# Patient Record
Sex: FEMALE | Race: WHITE | NOT HISPANIC OR LATINO | Employment: UNEMPLOYED | URBAN - METROPOLITAN AREA
[De-identification: names, ages, dates, MRNs, and addresses within clinical notes are randomized per-mention and may not be internally consistent; named-entity substitution may affect disease eponyms.]

---

## 2023-10-06 ENCOUNTER — APPOINTMENT (OUTPATIENT)
Dept: RADIOLOGY | Facility: HOSPITAL | Age: 68
DRG: 443 | End: 2023-10-06
Payer: MEDICARE

## 2023-10-06 ENCOUNTER — HOSPITAL ENCOUNTER (INPATIENT)
Facility: HOSPITAL | Age: 68
LOS: 1 days | Discharge: HOSPICE | DRG: 443 | End: 2023-10-06
Attending: EMERGENCY MEDICINE | Admitting: INTERNAL MEDICINE
Payer: MEDICARE

## 2023-10-06 VITALS
OXYGEN SATURATION: 95 % | HEIGHT: 62 IN | HEART RATE: 88 BPM | BODY MASS INDEX: 20.93 KG/M2 | RESPIRATION RATE: 15 BRPM | WEIGHT: 113.76 LBS | DIASTOLIC BLOOD PRESSURE: 59 MMHG | SYSTOLIC BLOOD PRESSURE: 117 MMHG

## 2023-10-06 DIAGNOSIS — R41.82 AMS (ALTERED MENTAL STATUS): ICD-10-CM

## 2023-10-06 DIAGNOSIS — K76.82 HEPATIC ENCEPHALOPATHY (MULTI): Primary | ICD-10-CM

## 2023-10-06 LAB
ALBUMIN SERPL BCP-MCNC: 2.7 G/DL (ref 3.4–5)
ALP SERPL-CCNC: 170 U/L (ref 33–136)
ALT SERPL W P-5'-P-CCNC: 36 U/L (ref 7–45)
AMMONIA PLAS-SCNC: 307 UMOL/L (ref 16–53)
ANION GAP BLDV CALCULATED.4IONS-SCNC: 11 MMOL/L (ref 10–25)
ANION GAP SERPL CALC-SCNC: 19 MMOL/L (ref 10–20)
AST SERPL W P-5'-P-CCNC: 68 U/L (ref 9–39)
BASE EXCESS BLDV CALC-SCNC: -3.1 MMOL/L (ref -2–3)
BILIRUB SERPL-MCNC: 18.3 MG/DL (ref 0–1.2)
BODY TEMPERATURE: 37 DEGREES CELSIUS
BUN SERPL-MCNC: 49 MG/DL (ref 6–23)
CA-I BLDV-SCNC: 1.41 MMOL/L (ref 1.1–1.33)
CALCIUM SERPL-MCNC: 9.9 MG/DL (ref 8.6–10.3)
CARDIAC TROPONIN I PNL SERPL HS: 26 NG/L (ref 0–13)
CHLORIDE BLDV-SCNC: 104 MMOL/L (ref 98–107)
CHLORIDE SERPL-SCNC: 101 MMOL/L (ref 98–107)
CO2 SERPL-SCNC: 20 MMOL/L (ref 21–32)
CREAT SERPL-MCNC: 1.48 MG/DL (ref 0.5–1.05)
ERYTHROCYTE [DISTWIDTH] IN BLOOD BY AUTOMATED COUNT: 17.6 % (ref 11.5–14.5)
GFR SERPL CREATININE-BSD FRML MDRD: 38 ML/MIN/1.73M*2
GLUCOSE BLD MANUAL STRIP-MCNC: 136 MG/DL (ref 74–99)
GLUCOSE BLDV-MCNC: 125 MG/DL (ref 74–99)
GLUCOSE SERPL-MCNC: 130 MG/DL (ref 74–99)
HCO3 BLDV-SCNC: 20.6 MMOL/L (ref 22–26)
HCT VFR BLD AUTO: 27.4 % (ref 36–46)
HCT VFR BLD EST: 27 % (ref 36–46)
HGB BLD-MCNC: 9.1 G/DL (ref 12–16)
HGB BLDV-MCNC: 9 G/DL (ref 12–16)
INR PPP: 2 (ref 0.9–1.1)
LACTATE BLDV-SCNC: 2 MMOL/L (ref 0.4–2)
MCH RBC QN AUTO: 37.3 PG (ref 26–34)
MCHC RBC AUTO-ENTMCNC: 33.2 G/DL (ref 32–36)
MCV RBC AUTO: 112 FL (ref 80–100)
NRBC BLD-RTO: 0.1 /100 WBCS (ref 0–0)
OXYHGB MFR BLDV: 82.2 % (ref 45–75)
PCO2 BLDV: 31 MM HG (ref 41–51)
PH BLDV: 7.43 PH (ref 7.33–7.43)
PLATELET # BLD AUTO: 94 X10*3/UL (ref 150–450)
PMV BLD AUTO: 10.9 FL (ref 7.5–11.5)
PO2 BLDV: 54 MM HG (ref 35–45)
POTASSIUM BLDV-SCNC: 5.8 MMOL/L (ref 3.5–5.3)
POTASSIUM SERPL-SCNC: 5.8 MMOL/L (ref 3.5–5.3)
PROT SERPL-MCNC: 5.4 G/DL (ref 6.4–8.2)
PROTHROMBIN TIME: 22.3 SECONDS (ref 9.8–12.8)
RBC # BLD AUTO: 2.44 X10*6/UL (ref 4–5.2)
SAO2 % BLDV: 88 % (ref 45–75)
SODIUM BLDV-SCNC: 130 MMOL/L (ref 136–145)
SODIUM SERPL-SCNC: 134 MMOL/L (ref 136–145)
WBC # BLD AUTO: 15.5 X10*3/UL (ref 4.4–11.3)

## 2023-10-06 PROCEDURE — 2580000001 HC RX 258 IV SOLUTIONS: Performed by: EMERGENCY MEDICINE

## 2023-10-06 PROCEDURE — 2500000001 HC RX 250 WO HCPCS SELF ADMINISTERED DRUGS (ALT 637 FOR MEDICARE OP): Performed by: INTERNAL MEDICINE

## 2023-10-06 PROCEDURE — 87040 BLOOD CULTURE FOR BACTERIA: CPT | Mod: AHULAB,CMCLAB | Performed by: INTERNAL MEDICINE

## 2023-10-06 PROCEDURE — 70498 CT ANGIOGRAPHY NECK: CPT | Performed by: RADIOLOGY

## 2023-10-06 PROCEDURE — 70496 CT ANGIOGRAPHY HEAD: CPT

## 2023-10-06 PROCEDURE — 36415 COLL VENOUS BLD VENIPUNCTURE: CPT | Performed by: EMERGENCY MEDICINE

## 2023-10-06 PROCEDURE — 70496 CT ANGIOGRAPHY HEAD: CPT | Performed by: RADIOLOGY

## 2023-10-06 PROCEDURE — 84484 ASSAY OF TROPONIN QUANT: CPT | Performed by: EMERGENCY MEDICINE

## 2023-10-06 PROCEDURE — 85027 COMPLETE CBC AUTOMATED: CPT | Performed by: EMERGENCY MEDICINE

## 2023-10-06 PROCEDURE — 71045 X-RAY EXAM CHEST 1 VIEW: CPT

## 2023-10-06 PROCEDURE — 84132 ASSAY OF SERUM POTASSIUM: CPT

## 2023-10-06 PROCEDURE — 99285 EMERGENCY DEPT VISIT HI MDM: CPT | Performed by: EMERGENCY MEDICINE

## 2023-10-06 PROCEDURE — 70450 CT HEAD/BRAIN W/O DYE: CPT | Performed by: RADIOLOGY

## 2023-10-06 PROCEDURE — 82947 ASSAY GLUCOSE BLOOD QUANT: CPT

## 2023-10-06 PROCEDURE — 82140 ASSAY OF AMMONIA: CPT | Performed by: EMERGENCY MEDICINE

## 2023-10-06 PROCEDURE — 70450 CT HEAD/BRAIN W/O DYE: CPT

## 2023-10-06 PROCEDURE — 85610 PROTHROMBIN TIME: CPT | Performed by: EMERGENCY MEDICINE

## 2023-10-06 PROCEDURE — 2550000001 HC RX 255 CONTRASTS: Performed by: EMERGENCY MEDICINE

## 2023-10-06 PROCEDURE — 2500000001 HC RX 250 WO HCPCS SELF ADMINISTERED DRUGS (ALT 637 FOR MEDICARE OP): Performed by: EMERGENCY MEDICINE

## 2023-10-06 PROCEDURE — 2500000004 HC RX 250 GENERAL PHARMACY W/ HCPCS (ALT 636 FOR OP/ED): Performed by: INTERNAL MEDICINE

## 2023-10-06 PROCEDURE — 70498 CT ANGIOGRAPHY NECK: CPT

## 2023-10-06 PROCEDURE — 36415 COLL VENOUS BLD VENIPUNCTURE: CPT | Performed by: INTERNAL MEDICINE

## 2023-10-06 PROCEDURE — 84075 ASSAY ALKALINE PHOSPHATASE: CPT | Performed by: EMERGENCY MEDICINE

## 2023-10-06 PROCEDURE — 1200000002 HC GENERAL ROOM WITH TELEMETRY DAILY

## 2023-10-06 RX ORDER — CALCIUM CARBONATE 300MG(750)
400 TABLET,CHEWABLE ORAL DAILY
COMMUNITY

## 2023-10-06 RX ORDER — LACTULOSE 10 G/15ML
20 SOLUTION ORAL 2 TIMES DAILY
Status: DISCONTINUED | OUTPATIENT
Start: 2023-10-06 | End: 2023-10-06

## 2023-10-06 RX ORDER — METOPROLOL TARTRATE 25 MG/1
25 TABLET, FILM COATED ORAL DAILY
COMMUNITY
End: 2023-10-06

## 2023-10-06 RX ORDER — TRAMADOL HYDROCHLORIDE 50 MG/1
50 TABLET ORAL EVERY 12 HOURS PRN
Status: DISCONTINUED | OUTPATIENT
Start: 2023-10-06 | End: 2023-10-07 | Stop reason: HOSPADM

## 2023-10-06 RX ORDER — LACTULOSE 10 G/15ML
30 SOLUTION ORAL DAILY
Status: DISCONTINUED | OUTPATIENT
Start: 2023-10-06 | End: 2023-10-06

## 2023-10-06 RX ORDER — PRAVASTATIN SODIUM 20 MG/1
20 TABLET ORAL DAILY
COMMUNITY

## 2023-10-06 RX ORDER — PANTOPRAZOLE SODIUM 40 MG/10ML
40 INJECTION, POWDER, LYOPHILIZED, FOR SOLUTION INTRAVENOUS DAILY
Status: DISCONTINUED | OUTPATIENT
Start: 2023-10-06 | End: 2023-10-07 | Stop reason: HOSPADM

## 2023-10-06 RX ORDER — ASPIRIN 325 MG
50000 TABLET, DELAYED RELEASE (ENTERIC COATED) ORAL 2 TIMES WEEKLY
COMMUNITY

## 2023-10-06 RX ORDER — TRAZODONE HYDROCHLORIDE 50 MG/1
25 TABLET ORAL NIGHTLY
COMMUNITY
End: 2023-10-06

## 2023-10-06 RX ORDER — FLUTICASONE PROPIONATE 50 MCG
2 SPRAY, SUSPENSION (ML) NASAL NIGHTLY
COMMUNITY

## 2023-10-06 RX ORDER — SODIUM CHLORIDE, SODIUM LACTATE, POTASSIUM CHLORIDE, CALCIUM CHLORIDE 600; 310; 30; 20 MG/100ML; MG/100ML; MG/100ML; MG/100ML
50 INJECTION, SOLUTION INTRAVENOUS CONTINUOUS
Status: DISCONTINUED | OUTPATIENT
Start: 2023-10-06 | End: 2023-10-06

## 2023-10-06 RX ORDER — LANOLIN ALCOHOL/MO/W.PET/CERES
400 CREAM (GRAM) TOPICAL DAILY
Status: CANCELLED | OUTPATIENT
Start: 2023-10-06

## 2023-10-06 RX ORDER — ONDANSETRON HYDROCHLORIDE 2 MG/ML
4 INJECTION, SOLUTION INTRAVENOUS EVERY 6 HOURS PRN
Status: DISCONTINUED | OUTPATIENT
Start: 2023-10-06 | End: 2023-10-07 | Stop reason: HOSPADM

## 2023-10-06 RX ORDER — LACTULOSE 10 G/15ML
20 SOLUTION ORAL 2 TIMES DAILY
COMMUNITY

## 2023-10-06 RX ORDER — LIDOCAINE HYDROCHLORIDE 20 MG/ML
1 JELLY TOPICAL ONCE
Status: COMPLETED | OUTPATIENT
Start: 2023-10-06 | End: 2023-10-06

## 2023-10-06 RX ORDER — BENZONATATE 100 MG/1
100 CAPSULE ORAL 3 TIMES DAILY
COMMUNITY

## 2023-10-06 RX ORDER — OMEPRAZOLE 40 MG/1
40 CAPSULE, DELAYED RELEASE ORAL
COMMUNITY

## 2023-10-06 RX ORDER — LACTULOSE 10 G/15ML
20 SOLUTION ORAL 3 TIMES DAILY
Status: DISCONTINUED | OUTPATIENT
Start: 2023-10-07 | End: 2023-10-07 | Stop reason: HOSPADM

## 2023-10-06 RX ORDER — MONTELUKAST SODIUM 10 MG/1
10 TABLET ORAL NIGHTLY
COMMUNITY
End: 2023-10-06

## 2023-10-06 RX ORDER — TRAMADOL HYDROCHLORIDE 50 MG/1
50 TABLET ORAL EVERY 12 HOURS PRN
COMMUNITY

## 2023-10-06 RX ORDER — LANOLIN ALCOHOL/MO/W.PET/CERES
100 CREAM (GRAM) TOPICAL DAILY
COMMUNITY

## 2023-10-06 RX ORDER — FLUTICASONE FUROATE, UMECLIDINIUM BROMIDE AND VILANTEROL TRIFENATATE 100; 62.5; 25 UG/1; UG/1; UG/1
1 POWDER RESPIRATORY (INHALATION) DAILY
COMMUNITY

## 2023-10-06 RX ORDER — LIDOCAINE HYDROCHLORIDE 20 MG/ML
1 JELLY TOPICAL ONCE
Status: DISCONTINUED | OUTPATIENT
Start: 2023-10-06 | End: 2023-10-06

## 2023-10-06 RX ORDER — FLUTICASONE FUROATE AND VILANTEROL 100; 25 UG/1; UG/1
1 POWDER RESPIRATORY (INHALATION)
Status: CANCELLED | OUTPATIENT
Start: 2023-10-06

## 2023-10-06 RX ADMIN — SODIUM CHLORIDE, POTASSIUM CHLORIDE, SODIUM LACTATE AND CALCIUM CHLORIDE 500 ML: 600; 310; 30; 20 INJECTION, SOLUTION INTRAVENOUS at 09:49

## 2023-10-06 RX ADMIN — LIDOCAINE HYDROCHLORIDE 1 APPLICATION: 20 JELLY TOPICAL at 10:25

## 2023-10-06 RX ADMIN — IOHEXOL 66 ML: 350 INJECTION, SOLUTION INTRAVENOUS at 09:11

## 2023-10-06 RX ADMIN — SODIUM CHLORIDE, POTASSIUM CHLORIDE, SODIUM LACTATE AND CALCIUM CHLORIDE 50 ML/HR: 600; 310; 30; 20 INJECTION, SOLUTION INTRAVENOUS at 14:10

## 2023-10-06 RX ADMIN — LACTULOSE 30 G: 20 SOLUTION ORAL at 12:38

## 2023-10-06 RX ADMIN — PIPERACILLIN SODIUM AND TAZOBACTAM SODIUM 2.25 G: 2; .25 INJECTION, SOLUTION INTRAVENOUS at 16:02

## 2023-10-06 RX ADMIN — LACTULOSE 20 G: 20 SOLUTION ORAL at 18:25

## 2023-10-06 SDOH — ECONOMIC STABILITY: FOOD INSECURITY: WITHIN THE PAST 12 MONTHS, YOU WORRIED THAT YOUR FOOD WOULD RUN OUT BEFORE YOU GOT MONEY TO BUY MORE.: NEVER TRUE

## 2023-10-06 SDOH — SOCIAL STABILITY: SOCIAL INSECURITY
WITHIN THE LAST YEAR, HAVE YOU BEEN KICKED, HIT, SLAPPED, OR OTHERWISE PHYSICALLY HURT BY YOUR PARTNER OR EX-PARTNER?: NO

## 2023-10-06 SDOH — HEALTH STABILITY: MENTAL HEALTH: HOW OFTEN DO YOU HAVE 6 OR MORE DRINKS ON ONE OCCASION?: NEVER

## 2023-10-06 SDOH — HEALTH STABILITY: MENTAL HEALTH: HOW OFTEN DO YOU HAVE A DRINK CONTAINING ALCOHOL?: NEVER

## 2023-10-06 SDOH — ECONOMIC STABILITY: FOOD INSECURITY: WITHIN THE PAST 12 MONTHS, THE FOOD YOU BOUGHT JUST DIDN'T LAST AND YOU DIDN'T HAVE MONEY TO GET MORE.: NEVER TRUE

## 2023-10-06 SDOH — ECONOMIC STABILITY: HOUSING INSECURITY: IN THE LAST 12 MONTHS, HOW MANY PLACES HAVE YOU LIVED?: 2

## 2023-10-06 SDOH — HEALTH STABILITY: PHYSICAL HEALTH: ON AVERAGE, HOW MANY DAYS PER WEEK DO YOU ENGAGE IN MODERATE TO STRENUOUS EXERCISE (LIKE A BRISK WALK)?: 0 DAYS

## 2023-10-06 SDOH — SOCIAL STABILITY: SOCIAL INSECURITY: WITHIN THE LAST YEAR, HAVE YOU BEEN AFRAID OF YOUR PARTNER OR EX-PARTNER?: NO

## 2023-10-06 SDOH — ECONOMIC STABILITY: INCOME INSECURITY: HOW HARD IS IT FOR YOU TO PAY FOR THE VERY BASICS LIKE FOOD, HOUSING, MEDICAL CARE, AND HEATING?: NOT HARD AT ALL

## 2023-10-06 SDOH — ECONOMIC STABILITY: INCOME INSECURITY: IN THE LAST 12 MONTHS, WAS THERE A TIME WHEN YOU WERE NOT ABLE TO PAY THE MORTGAGE OR RENT ON TIME?: NO

## 2023-10-06 SDOH — HEALTH STABILITY: MENTAL HEALTH
STRESS IS WHEN SOMEONE FEELS TENSE, NERVOUS, ANXIOUS, OR CAN'T SLEEP AT NIGHT BECAUSE THEIR MIND IS TROUBLED. HOW STRESSED ARE YOU?: TO SOME EXTENT

## 2023-10-06 SDOH — SOCIAL STABILITY: SOCIAL NETWORK: ARE YOU MARRIED, WIDOWED, DIVORCED, SEPARATED, NEVER MARRIED, OR LIVING WITH A PARTNER?: MARRIED

## 2023-10-06 SDOH — SOCIAL STABILITY: SOCIAL NETWORK: IN A TYPICAL WEEK, HOW MANY TIMES DO YOU TALK ON THE PHONE WITH FAMILY, FRIENDS, OR NEIGHBORS?: THREE TIMES A WEEK

## 2023-10-06 SDOH — SOCIAL STABILITY: SOCIAL NETWORK
DO YOU BELONG TO ANY CLUBS OR ORGANIZATIONS SUCH AS CHURCH GROUPS UNIONS, FRATERNAL OR ATHLETIC GROUPS, OR SCHOOL GROUPS?: NO

## 2023-10-06 SDOH — SOCIAL STABILITY: SOCIAL INSECURITY: WITHIN THE LAST YEAR, HAVE YOU BEEN HUMILIATED OR EMOTIONALLY ABUSED IN OTHER WAYS BY YOUR PARTNER OR EX-PARTNER?: NO

## 2023-10-06 SDOH — SOCIAL STABILITY: SOCIAL NETWORK: HOW OFTEN DO YOU GET TOGETHER WITH FRIENDS OR RELATIVES?: THREE TIMES A WEEK

## 2023-10-06 SDOH — ECONOMIC STABILITY: TRANSPORTATION INSECURITY
IN THE PAST 12 MONTHS, HAS THE LACK OF TRANSPORTATION KEPT YOU FROM MEDICAL APPOINTMENTS OR FROM GETTING MEDICATIONS?: NO

## 2023-10-06 SDOH — SOCIAL STABILITY: SOCIAL NETWORK: HOW OFTEN DO YOU ATTENT MEETINGS OF THE CLUB OR ORGANIZATION YOU BELONG TO?: NEVER

## 2023-10-06 SDOH — HEALTH STABILITY: MENTAL HEALTH: HOW MANY STANDARD DRINKS CONTAINING ALCOHOL DO YOU HAVE ON A TYPICAL DAY?: PATIENT DOES NOT DRINK

## 2023-10-06 SDOH — ECONOMIC STABILITY: TRANSPORTATION INSECURITY
IN THE PAST 12 MONTHS, HAS LACK OF TRANSPORTATION KEPT YOU FROM MEETINGS, WORK, OR FROM GETTING THINGS NEEDED FOR DAILY LIVING?: NO

## 2023-10-06 SDOH — ECONOMIC STABILITY: INCOME INSECURITY: IN THE PAST 12 MONTHS, HAS THE ELECTRIC, GAS, OIL, OR WATER COMPANY THREATENED TO SHUT OFF SERVICE IN YOUR HOME?: NO

## 2023-10-06 SDOH — ECONOMIC STABILITY: HOUSING INSECURITY
IN THE LAST 12 MONTHS, WAS THERE A TIME WHEN YOU DID NOT HAVE A STEADY PLACE TO SLEEP OR SLEPT IN A SHELTER (INCLUDING NOW)?: NO

## 2023-10-06 SDOH — HEALTH STABILITY: PHYSICAL HEALTH: ON AVERAGE, HOW MANY MINUTES DO YOU ENGAGE IN EXERCISE AT THIS LEVEL?: 0 MIN

## 2023-10-06 SDOH — SOCIAL STABILITY: SOCIAL NETWORK: HOW OFTEN DO YOU ATTEND CHURCH OR RELIGIOUS SERVICES?: NEVER

## 2023-10-06 ASSESSMENT — PAIN DESCRIPTION - PROGRESSION: CLINICAL_PROGRESSION: NOT CHANGED

## 2023-10-06 ASSESSMENT — COLUMBIA-SUICIDE SEVERITY RATING SCALE - C-SSRS
2. HAVE YOU ACTUALLY HAD ANY THOUGHTS OF KILLING YOURSELF?: NO
6. HAVE YOU EVER DONE ANYTHING, STARTED TO DO ANYTHING, OR PREPARED TO DO ANYTHING TO END YOUR LIFE?: NO
1. IN THE PAST MONTH, HAVE YOU WISHED YOU WERE DEAD OR WISHED YOU COULD GO TO SLEEP AND NOT WAKE UP?: NO

## 2023-10-06 ASSESSMENT — PAIN SCALES - GENERAL
PAINLEVEL_OUTOF10: 0 - NO PAIN

## 2023-10-06 ASSESSMENT — LIFESTYLE VARIABLES
SKIP TO QUESTIONS 9-10: 1
AUDIT-C TOTAL SCORE: 0

## 2023-10-06 ASSESSMENT — PAIN - FUNCTIONAL ASSESSMENT: PAIN_FUNCTIONAL_ASSESSMENT: 0-10

## 2023-10-06 NOTE — PROGRESS NOTES
10/06/23 1408   Physical Activity   On average, how many days per week do you engage in moderate to strenuous exercise (like a brisk walk)? 0 days   On average, how many minutes do you engage in exercise at this level? 0 min   Financial Resource Strain   How hard is it for you to pay for the very basics like food, housing, medical care, and heating? Not hard   Housing Stability   In the last 12 months, was there a time when you were not able to pay the mortgage or rent on time? N   In the last 12 months, how many places have you lived? 2   In the last 12 months, was there a time when you did not have a steady place to sleep or slept in a shelter (including now)? N   Transportation Needs   In the past 12 months, has lack of transportation kept you from medical appointments or from getting medications? no   In the past 12 months, has lack of transportation kept you from meetings, work, or from getting things needed for daily living? No   Food Insecurity   Within the past 12 months, you worried that your food would run out before you got the money to buy more. Never true   Within the past 12 months, the food you bought just didn't last and you didn't have money to get more. Never true   Stress   Do you feel stress - tense, restless, nervous, or anxious, or unable to sleep at night because your mind is troubled all the time - these days? To some exte   Social Connections   In a typical week, how many times do you talk on the phone with family, friends, or neighbors? Three   How often do you get together with friends or relatives? Three times   How often do you attend Advent or Jewish services? Never   Do you belong to any clubs or organizations such as Advent groups, unions, fraternal or athletic groups, or school groups? No   How often do you attend meetings of the clubs or organizations you belong to? Never   Are you , , , , never , or living with a partner?     Intimate Partner Violence   Within the last year, have you been afraid of your partner or ex-partner? No   Within the last year, have you been humiliated or emotionally abused in other ways by your partner or ex-partner? No   Within the last year, have you been kicked, hit, slapped, or otherwise physically hurt by your partner or ex-partner? No   Alcohol Use   Q1: How often do you have a drink containing alcohol? Never  (history of alcohol abuse)   Q2: How many drinks containing alcohol do you have on a typical day when you are drinking? None   Q3: How often do you have six or more drinks on one occasion? Never   Utilities   In the past 12 months has the electric, gas, oil, or water company threatened to shut off services in your home? No

## 2023-10-06 NOTE — ED PROVIDER NOTES
HPI   Chief Complaint   Patient presents with    Stroke       67-year-old woman past medical history of alcoholism and liver failure here with altered mental status over the past 24 hours.  Patient is at [place] [facility].  Patient's  reports that her baseline she is awake and alert and she actually just had a liver transplant virtual visit yesterday where she was talkative.  He is aware that her liver enzymes have been elevated and thinks that it was related to an ammonia level being elevated.  Patient had a normal blood sugar prior to arrival and stroke alert was called immediately.      History provided by:  Caregiver  History limited by:  Acuity of condition                      [place] Coma Scale Score: 9         NIH Stroke Scale: 18          Patient History   Past Medical History:   Diagnosis Date    Other specified anxiety disorders     Depression with anxiety    Personal history of other diseases of the circulatory system     Personal history of cardiac murmur    Personal history of other diseases of the circulatory system     History of hypertension     Past Surgical History:   Procedure Laterality Date     SECTION, CLASSIC  2013     Section    OTHER SURGICAL HISTORY  2013    Cornea Transplant     No family history on file.  Social History     Tobacco Use    Smoking status: Not on file    Smokeless tobacco: Not on file   Substance Use Topics    Alcohol use: Not on file    Drug use: Not on file       Physical Exam   ED Triage Vitals   Temp Heart Rate Resp BP   -- 10/06/23 0905 10/06/23 0905 10/06/23 0905    98 18 128/68      SpO2 Temp src Heart Rate Source Patient Position   10/06/23 0905 -- 10/06/23 0855 --   94 %  Monitor       BP Location FiO2 (%)     -- --             Physical Exam  Vitals and nursing note reviewed.   Constitutional:       General: She is in acute distress.      Appearance: Normal appearance. She is ill-appearing. She is not toxic-appearing.   HENT:       Head: Normocephalic.      Mouth/Throat:      Mouth: Mucous membranes are moist.   Eyes:      General: Scleral icterus present.      Conjunctiva/sclera: Conjunctivae normal.      Pupils: Pupils are equal, round, and reactive to light.   Cardiovascular:      Rate and Rhythm: Normal rate and regular rhythm.      Pulses:           Radial pulses are 2+ on the right side and 2+ on the left side.   Pulmonary:      Effort: Pulmonary effort is normal. No respiratory distress.      Breath sounds: Normal breath sounds.   Abdominal:      General: Abdomen is flat.      Palpations: Abdomen is soft.      Tenderness: There is no abdominal tenderness. There is no guarding or rebound.   Musculoskeletal:      Cervical back: Normal range of motion and neck supple.      Right lower leg: No edema.      Left lower leg: No edema.   Skin:     General: Skin is warm.      Coloration: Skin is jaundiced.      Findings: Bruising present.   Neurological:      Mental Status: She is alert. She is disoriented.      Comments: Patient's eyes are roaming but mostly gaze preference to the right.  Patient has relaxed tone throughout her extremities.  She is not responding to painful stimulation.  She is protecting her airway.  She is nonverbal.  Her eyes are intermittently open spontaneously.       Results for orders placed or performed during the hospital encounter of 10/06/23   CBC   Result Value Ref Range    WBC 15.5 (H) 4.4 - 11.3 x10*3/uL    nRBC 0.1 (H) 0.0 - 0.0 /100 WBCs    RBC 2.44 (L) 4.00 - 5.20 x10*6/uL    Hemoglobin 9.1 (L) 12.0 - 16.0 g/dL    Hematocrit 27.4 (L) 36.0 - 46.0 %     (H) 80 - 100 fL    MCH 37.3 (H) 26.0 - 34.0 pg    MCHC 33.2 32.0 - 36.0 g/dL    RDW 17.6 (H) 11.5 - 14.5 %    Platelets 94 (L) 150 - 450 x10*3/uL    MPV 10.9 7.5 - 11.5 fL   Comprehensive Metabolic Panel   Result Value Ref Range    Glucose 130 (H) 74 - 99 mg/dL    Sodium 134 (L) 136 - 145 mmol/L    Potassium 5.8 (H) 3.5 - 5.3 mmol/L    Chloride 101 98 - 107  mmol/L    Bicarbonate 20 (L) 21 - 32 mmol/L    Anion Gap 19 10 - 20 mmol/L    Urea Nitrogen 49 (H) 6 - 23 mg/dL    Creatinine 1.48 (H) 0.50 - 1.05 mg/dL    eGFR 38 (L) >60 mL/min/1.73m*2    Calcium 9.9 8.6 - 10.3 mg/dL    Albumin 2.7 (L) 3.4 - 5.0 g/dL    Alkaline Phosphatase 170 (H) 33 - 136 U/L    Total Protein 5.4 (L) 6.4 - 8.2 g/dL    AST 68 (H) 9 - 39 U/L    Bilirubin, Total 18.3 (H) 0.0 - 1.2 mg/dL    ALT 36 7 - 45 U/L   Protime-INR   Result Value Ref Range    Protime 22.3 (H) 9.8 - 12.8 seconds    INR 2.0 (H) 0.9 - 1.1   Troponin I, High Sensitivity   Result Value Ref Range    Troponin I, High Sensitivity 26 (H) 0 - 13 ng/L   Ammonia   Result Value Ref Range    Ammonia 307 (HH) 16 - 53 umol/L   POCT GLUCOSE   Result Value Ref Range    POCT Glucose 136 (H) 74 - 99 mg/dL   Blood Gas Venous Full Panel Unsolicited   Result Value Ref Range    POCT pH, Venous 7.43 7.33 - 7.43 pH    POCT pCO2, Venous 31 (L) 41 - 51 mm Hg    POCT pO2, Venous 54 (H) 35 - 45 mm Hg    POCT SO2, Venous 88 (H) 45 - 75 %    POCT Oxy Hemoglobin, Venous 82.2 (H) 45.0 - 75.0 %    POCT Hematocrit Calculated, Venous 27.0 (L) 36.0 - 46.0 %    POCT Sodium, Venous 130 (L) 136 - 145 mmol/L    POCT Potassium, Venous 5.8 (H) 3.5 - 5.3 mmol/L    POCT Chloride, Venous 104 98 - 107 mmol/L    POCT Ionized Calicum, Venous 1.41 (H) 1.10 - 1.33 mmol/L    POCT Glucose, Venous 125 (H) 74 - 99 mg/dL    POCT Lactate, Venous 2.0 0.4 - 2.0 mmol/L    POCT Base Excess, Venous -3.1 (L) -2.0 - 3.0 mmol/L    POCT HCO3 Calculated, Venous 20.6 (L) 22.0 - 26.0 mmol/L    POCT Hemoglobin, Venous 9.0 (L) 12.0 - 16.0 g/dL    POCT Anion Gap, Venous 11.0 10.0 - 25.0 mmol/L    Patient Temperature 37.0 degrees Celsius        ED Course & [facility]   ED Course as of 10/06/23 1411   Fri Oct 06, 2023   1327 CT brain attack head wo IV contrast [JG]   1327 Patient was a stroke alert and there is no acute process noted on her CT head or CT angio head and neck.  Patient's ammonia level is  elevated above 300.  I ordered a nasogastric tube to get lactulose into her.  I discussed the case with Dr. Adkins and due to the fact that she is a [facility] patient followed by Dr. Scott she is going to be transferred to [facility].  Dr. Jalyn Nguyen is willing to write transitional orders for her if it takes more than a day to get her transferred over to [facility].  She is hemodynamically stable in the emergency department.  [JG]   1334 I did not give tPA to her because this was not an actual stroke and I have another alternate diagnosis to explain her altered mental status. [JG]      ED Course User Index  [JG] Andra Pabon MD         Diagnoses as of 10/06/23 1411   Hepatic encephalopathy (CMS/HCC)       Medical Decision Making      Procedure  Procedures     Andra Pabon MD  10/06/23 1332       Andra Pabon MD  10/06/23 1334       Andra Pabon MD  10/06/23 1412

## 2023-10-06 NOTE — PROGRESS NOTES
Pharmacy Medication History Review    Melissa Gudino is a 67 y.o. female.    Medication list updated per SNF list.     The list below reflectives the updated PTA list. Please review each medication in order reconciliation for additional clarification and justification.    Prior to Admission Medications   Prescriptions Last Dose Informant Patient Reported? Taking?   benzonatate (Tessalon) 100 mg capsule   Yes No   Sig: Take 1 capsule (100 mg) by mouth 3 times a day. Do not crush or chew.   cholecalciferol (Vitamin D-3) 1,250 mcg (50,000 unit) capsule   Yes No   Sig: Take 1 capsule (50,000 Units) by mouth 2 times a week. Monday and Thursday   fluticasone (Flonase) 50 mcg/actuation nasal spray   Yes No   Sig: Administer 2 sprays into each nostril once daily at bedtime. Shake gently. Before first use, prime pump. After use, clean tip and replace cap.   fluticasone-umeclidin-vilanter (Trelegy Ellipta) 100-62.5-25 mcg blister with device   Yes No   Sig: Inhale 1 puff once daily.   lactulose 20 gram/30 mL oral solution   Yes No   Sig: Take 30 mL (20 g) by mouth 2 times a day.   magnesium oxide (Mag-Ox) 400 mg tablet   Yes No   Sig: Take 1 tablet (400 mg) by mouth once daily.   metoprolol tartrate (Lopressor) 25 mg tablet   Yes No   Sig: Take 1 tablet (25 mg) by mouth once daily.   montelukast (Singulair) 10 mg tablet   Yes No   Sig: Take 1 tablet (10 mg) by mouth once daily at bedtime.   multivitamin with minerals (multivitamin-iron-folic acid) tablet   Yes No   Sig: Take 1 tablet by mouth once daily.   omeprazole (PriLOSEC) 40 mg DR capsule   Yes No   Sig: Take 1 capsule (40 mg) by mouth once daily in the morning. Take before meals. Do not crush or chew.   pravastatin (Pravachol) 20 mg tablet   Yes No   Sig: Take 1 tablet (20 mg) by mouth once daily.   thiamine 100 mg tablet   Yes No   Sig: Take 1 tablet (100 mg) by mouth once daily.   traMADol (Ultram) 50 mg tablet   Yes No   Sig: Take 1 tablet (50 mg) by mouth every 12  hours if needed (pain).   traZODone (Desyrel) 50 mg tablet   Yes No   Sig: Take 0.5 tablets (25 mg) by mouth once daily at bedtime.      Facility-Administered Medications: None           The list below reflectives the updated allergy list. Please review each documented allergy for additional clarification and justification.  Allergies  Indicated as Unable to Assess by Heaven Eubanks, EMT on 10/6/2023 (Patient unreliable)   Not on File         Rama Broussard, CecileD

## 2023-10-06 NOTE — PROGRESS NOTES
10/06/23 1404   [facility] Disability Status   Are you deaf or do you have serious difficulty hearing? N   Are you blind or do you have serious difficulty seeing, even when wearing glasses? N   Because of a physical, mental, or emotional condition, do you have serious difficulty concentrating, remembering, or making decisions? (5 years old or older) Y   Do you have serious difficulty walking or climbing stairs? Y   Do you have serious difficulty dressing or bathing? Y   Because of a physical, mental, or emotional condition, do you have serious difficulty doing errands alone such as visiting the doctor? Y

## 2023-10-06 NOTE — PROGRESS NOTES
From [facility]     10/06/23 1249   Current Planned Discharge Disposition   Current Planned Discharge Disposition SNF

## 2023-10-06 NOTE — PROGRESS NOTES
Transitional Care Coordination Progress Note:  Plan per Medical/Surgical team: treatment of hepatic encephalopathy with lactulose, NGT  Status:inpatient  Payor source: medicare  Discharge disposition: [facility]   Potential Barriers: ammonia 113, bilirubin 16  ADOD: 10/8/2023  ABDULLAHI Tran RN, BSN Transitional Care Coordinator ED# [phone]      10/06/23 1404   Discharge Planning   Living Arrangements Alone   Support Systems Spouse/significant other   Assistance Needed NGT, ?nutrition, ammonia level   Type of Residence Skilled nursing facility   Do you have animals or pets at home? No   Home or Post Acute Services Post acute facilities (Rehab/SNF/etc)   Type of Post Acute Facility Services Skilled nursing   Patient expects to be discharged to: [facility]   Does the patient need discharge transport arranged? Yes   RoundTrip coordination needed? Yes   Has discharge transport been arranged? No

## 2023-10-06 NOTE — PROGRESS NOTES
"Melissa Gudino is a 67 y.o. female on day 0 of admission presenting with Hepatic encephalopathy (CMS/HCC).    Subjective   Patient endorsed to me by prior care team.  Originally excepted to [facility] but unlikely bed availability.  Therefore initially excepted Dr. DAMION Nguyen, but bed did become available and patient will be transferred to [facility] as per previous plan under Dr. Adkins.  Transfer note completed.  Patient remained stable otherwise for transfer.       Objective     Physical Exam    Last Recorded Vitals  Blood pressure 106/56, pulse 87, resp. rate 12, height 1.575 m (5' 2\"), weight 51.6 kg (113 lb 12.1 oz), SpO2 98 %.  Intake/Output last 3 Shifts:  I/O last 3 completed shifts:  In: 550 (10.7 mL/kg) [IV Piggyback:550]  Out: - (0 mL/kg)   Weight: 51.6 kg     Relevant Results           This patient currently has cardiac telemetry ordered; if you would like to modify or discontinue the telemetry order, click here to go to the orders activity to modify/discontinue the order.                 Assessment/Plan   Principal Problem:    Hepatic encephalopathy (CMS/HCC)    Transfer to [facility]             Edgar Richardson MD MPH      "

## 2023-10-07 NOTE — H&P
"History Of Present Illness  Melissa Gudino is a 67 y.o. female presenting with altered mental status, she has end-stage liver disease, she had severe jaundice, she has recurrent hospitalization, history of alcohol abuse in the past, today she was noted to be had lethargic nests.  She was transferred to [facility] emergency department found to be having ammonia level of 307 and abnormal liver transaminases high INR she is being admitted here.  She is usually a [facility] patient and she is waiting to be transferred to the [facility].  .     Past Medical History  She has a past medical history of Other specified anxiety disorders, Personal history of other diseases of the circulatory system, and Personal history of other diseases of the circulatory system.    Surgical History  She has a past surgical history that includes  section, classic (2013) and Other surgical history (2013).     Social History  She has no history on file for tobacco use, alcohol use, and drug use.    Family History  No family history on file.     Allergies  Patient has no known allergies.  Review of systems unable she is very lethargic       Physical Exam  Constitutional:       General: She is not in acute distress.  HENT:      Head: Normocephalic and atraumatic.   Cardiovascular:      Rate and Rhythm: Normal rate and regular rhythm.   Pulmonary:      Breath sounds: Normal breath sounds.   Musculoskeletal:         General: Swelling present.   Skin:     Coloration: Skin is jaundiced.   Neurological:      Motor: Weakness present.          Last Recorded Vitals  Blood pressure 106/56, pulse 87, resp. rate 12, height 1.575 m (5' 2\"), weight 51.6 kg (113 lb 12.1 oz), SpO2 98 %.    Relevant Results  Results for orders placed or performed during the hospital encounter of 10/06/23 (from the past 96 hour(s))   POCT GLUCOSE   Result Value Ref Range    POCT Glucose 136 (H) 74 - 99 mg/dL   CBC   Result Value Ref Range    WBC 15.5 " (H) 4.4 - 11.3 x10*3/uL    nRBC 0.1 (H) 0.0 - 0.0 /100 WBCs    RBC 2.44 (L) 4.00 - 5.20 x10*6/uL    Hemoglobin 9.1 (L) 12.0 - 16.0 g/dL    Hematocrit 27.4 (L) 36.0 - 46.0 %     (H) 80 - 100 fL    MCH 37.3 (H) 26.0 - 34.0 pg    MCHC 33.2 32.0 - 36.0 g/dL    RDW 17.6 (H) 11.5 - 14.5 %    Platelets 94 (L) 150 - 450 x10*3/uL    MPV 10.9 7.5 - 11.5 fL   Comprehensive Metabolic Panel   Result Value Ref Range    Glucose 130 (H) 74 - 99 mg/dL    Sodium 134 (L) 136 - 145 mmol/L    Potassium 5.8 (H) 3.5 - 5.3 mmol/L    Chloride 101 98 - 107 mmol/L    Bicarbonate 20 (L) 21 - 32 mmol/L    Anion Gap 19 10 - 20 mmol/L    Urea Nitrogen 49 (H) 6 - 23 mg/dL    Creatinine 1.48 (H) 0.50 - 1.05 mg/dL    eGFR 38 (L) >60 mL/min/1.73m*2    Calcium 9.9 8.6 - 10.3 mg/dL    Albumin 2.7 (L) 3.4 - 5.0 g/dL    Alkaline Phosphatase 170 (H) 33 - 136 U/L    Total Protein 5.4 (L) 6.4 - 8.2 g/dL    AST 68 (H) 9 - 39 U/L    Bilirubin, Total 18.3 (H) 0.0 - 1.2 mg/dL    ALT 36 7 - 45 U/L   Protime-INR   Result Value Ref Range    Protime 22.3 (H) 9.8 - 12.8 seconds    INR 2.0 (H) 0.9 - 1.1   Troponin I, High Sensitivity   Result Value Ref Range    Troponin I, High Sensitivity 26 (H) 0 - 13 ng/L   Ammonia   Result Value Ref Range    Ammonia 307 (HH) 16 - 53 umol/L   Blood Gas Venous Full Panel Unsolicited   Result Value Ref Range    POCT pH, Venous 7.43 7.33 - 7.43 pH    POCT pCO2, Venous 31 (L) 41 - 51 mm Hg    POCT pO2, Venous 54 (H) 35 - 45 mm Hg    POCT SO2, Venous 88 (H) 45 - 75 %    POCT Oxy Hemoglobin, Venous 82.2 (H) 45.0 - 75.0 %    POCT Hematocrit Calculated, Venous 27.0 (L) 36.0 - 46.0 %    POCT Sodium, Venous 130 (L) 136 - 145 mmol/L    POCT Potassium, Venous 5.8 (H) 3.5 - 5.3 mmol/L    POCT Chloride, Venous 104 98 - 107 mmol/L    POCT Ionized Calicum, Venous 1.41 (H) 1.10 - 1.33 mmol/L    POCT Glucose, Venous 125 (H) 74 - 99 mg/dL    POCT Lactate, Venous 2.0 0.4 - 2.0 mmol/L    POCT Base Excess, Venous -3.1 (L) -2.0 - 3.0 mmol/L     POCT HCO3 Calculated, Venous 20.6 (L) 22.0 - 26.0 mmol/L    POCT Hemoglobin, Venous 9.0 (L) 12.0 - 16.0 g/dL    POCT Anion Gap, Venous 11.0 10.0 - 25.0 mmol/L    Patient Temperature 37.0 degrees Celsius      XR chest abdomen for OG NG placement    Result Date: 10/6/2023  Interpreted By:  Mirza Vasques, STUDY: XR CHEST ABDOMEN FOR OG NG PLACEMENT; 10/6/2023 11:41 am   INDICATION: Signs/Symptoms:ng placement confirmation.   COMPARISON: None.   ACCESSION NUMBER(S): [id number]   ORDERING CLINICIAN: MATI VELIZ   FINDINGS: 2 supine AP radiographs of the abdomen were obtained. An enteric tube is seen, with the tip overlying the mid stomach. There is a nonobstructive bowel gas pattern present. Free intraperitoneal air and air-fluid levels cannot be excluded without upright or decubitus images.       Enteric tube placement, as above.   MACRO: None   Signed by: Mirza Vasques 10/6/2023 11:48 AM Dictation workstation:   [id number]    CT angio brain attack head w IV contrast and post procedure    Result Date: 10/6/2023  Interpreted By:  Walter Marsh and Tavana Shahrzad STUDY: CT ANGIO BRAIN ATTACK NECK W IV CONTRAST AND POST PROCEDURE; CT ANGIO BRAIN ATTACK HEAD W IV CONTRAST AND POST PROCEDURE;  10/6/2023 9:09 am   INDICATION: Signs/Symptoms:ams   COMPARISON: Same day head CT   ACCESSION NUMBER(S): [id number]; [id number]   ORDERING CLINICIAN: MATI VELIZ   TECHNIQUE: Multiple contiguous axial noncontrast images of the head were obtained. Following IV contrast administration of, a CT angiography of the head and neck was performed. MIPS and 3D reconstructions of the [place] and neck were created on an independent workstation and reviewed.   FINDINGS:   CTA NECK: Three-vessel aortic arch. The origin of the arch vessels are patent.   LEFT VERTEBRAL ARTERY: Atherosclerotic calcification noted involving the origin of the vertebral artery. The remainder of the vessel is widely patent. No hemodynamically  significant stenosis, occlusion, or dissection.   LEFT COMMON/INTERNAL CAROTID ARTERY: The common carotid artery is patent.  Atherosclerotic calcification noted involving the carotid bulb extending into the proximal internal carotid artery resulting in no significant stenosis by NASCET criteria. Otherwise, no hemodynamically significant stenosis, occlusion, or dissection.   RIGHT VERTEBRAL ARTERY: Atherosclerotic calcification noted involving the origin of the vertebral artery. The remainder of the vessel is widely patent. No hemodynamically significant stenosis, occlusion, or dissection.   RIGHT COMMON/INTERNAL CAROTID ARTERY: The common carotid artery is patent. The cervical internal carotid artery is patent with 0% stenosis by NASCET criteria. Atherosclerotic calcification noted involving the carotid bulb extending into the proximal internal carotid artery resulting in approximately 25% stenosis by NASCET criteria. The remainder of cervical internal carotid arteries widely patent. The internal carotid artery in the neck demonstrates a retropharyngeal course.     The neck soft tissues show no evidence of mass, fluid collection, or enlarged lymph nodes. There is vertebral plana of T6 vertebral body. Severe compression deformity of T5 vertebral body is also noted moderate degenerative changes of the cervical spine noted, most pronounced at C5-6.. Diffuse demineralization of the osseous structures.   Partially visualized upper lungs demonstrate large right and moderate left pleural effusion with fluid along the fissures. There is associated compressive atelectasis of the adjacent lung parenchyma. Postsurgical changes of bilateral mastectomy noted. Neck is contracted to the right.       CTA HEAD:   ANTERIOR CIRCULATION: No aneurysm.   - Internal Carotid Arteries: Calcification of bilateral parasellar internal carotid arteries are noted without hemodynamically significant stenosis. No hemodynamically significant  stenosis or occlusion.   - Middle Cerebral Arteries:  No hemodynamically significant stenosis or occlusion.   - Anterior Cerebral Arteries:  No hemodynamically significant stenosis or occlusion.     POSTERIOR CIRCULATION: No aneurysm.   - Intracranial Vertebral Arteries:  No hemodynamically significant stenosis or occlusion.   - Basilar Artery:  No hemodynamically significant stenosis or occlusion.   - Posterior Cerebral Arteries: There is a hypoplastic P1 segment of the left PCA with prominent left PCOM supplying the posterior circulation. No hemodynamically significant stenosis or occlusion.       1.  Atherosclerotic calcification of the carotid bulbs resulting in approximately 25% stenosis of the proximal right cervical ICA. The remainder of the right cervical ICA is patent. 2. Otherwise, there is no evidence of source vessel arterial occlusion, flow significant stenosis, dissection, or aneurysm within the head and neck. 3. Large right and moderate left pleural effusions. 4. Severe diffuse demineralization of the osseous structures noted with vertebral plana of T6 and severe compression deformity of T5 vertebral bodies. There is no retropulsion into the canal. 5.  Additional findings as above.     I personally reviewed the images/study and I agree with the findings as stated. This study was interpreted at [place], [place].   MACRO: None   Signed by: Walter Marsh 10/6/2023 9:59 AM Dictation workstation:   [id number]    CT angio brain attack neck w IV contrast and post procedure    Result Date: 10/6/2023  Interpreted By:  Walter Marsh and Tavana Shahrzad STUDY: CT ANGIO BRAIN ATTACK NECK W IV CONTRAST AND POST PROCEDURE; CT ANGIO BRAIN ATTACK HEAD W IV CONTRAST AND POST PROCEDURE;  10/6/2023 9:09 am   INDICATION: Signs/Symptoms:ams   COMPARISON: Same day head CT   ACCESSION NUMBER(S): [id number]; [id number]   ORDERING CLINICIAN: MATI VELIZ   TECHNIQUE:  Multiple contiguous axial noncontrast images of the head were obtained. Following IV contrast administration of, a CT angiography of the head and neck was performed. MIPS and 3D reconstructions of the [place] of [place] and neck were created on an independent workstation and reviewed.   FINDINGS:   CTA NECK: Three-vessel aortic arch. The origin of the arch vessels are patent.   LEFT VERTEBRAL ARTERY: Atherosclerotic calcification noted involving the origin of the vertebral artery. The remainder of the vessel is widely patent. No hemodynamically significant stenosis, occlusion, or dissection.   LEFT COMMON/INTERNAL CAROTID ARTERY: The common carotid artery is patent.  Atherosclerotic calcification noted involving the carotid bulb extending into the proximal internal carotid artery resulting in no significant stenosis by NASCET criteria. Otherwise, no hemodynamically significant stenosis, occlusion, or dissection.   RIGHT VERTEBRAL ARTERY: Atherosclerotic calcification noted involving the origin of the vertebral artery. The remainder of the vessel is widely patent. No hemodynamically significant stenosis, occlusion, or dissection.   RIGHT COMMON/INTERNAL CAROTID ARTERY: The common carotid artery is patent. The cervical internal carotid artery is patent with 0% stenosis by NASCET criteria. Atherosclerotic calcification noted involving the carotid bulb extending into the proximal internal carotid artery resulting in approximately 25% stenosis by NASCET criteria. The remainder of cervical internal carotid arteries widely patent. The internal carotid artery in the neck demonstrates a retropharyngeal course.     The neck soft tissues show no evidence of mass, fluid collection, or enlarged lymph nodes. There is vertebral plana of T6 vertebral body. Severe compression deformity of T5 vertebral body is also noted moderate degenerative changes of the cervical spine noted, most pronounced at C5-6.. Diffuse demineralization of the  osseous structures.   Partially visualized upper lungs demonstrate large right and moderate left pleural effusion with fluid along the fissures. There is associated compressive atelectasis of the adjacent lung parenchyma. Postsurgical changes of bilateral mastectomy noted. Neck is contracted to the right.       CTA HEAD:   ANTERIOR CIRCULATION: No aneurysm.   - Internal Carotid Arteries: Calcification of bilateral parasellar internal carotid arteries are noted without hemodynamically significant stenosis. No hemodynamically significant stenosis or occlusion.   - Middle Cerebral Arteries:  No hemodynamically significant stenosis or occlusion.   - Anterior Cerebral Arteries:  No hemodynamically significant stenosis or occlusion.     POSTERIOR CIRCULATION: No aneurysm.   - Intracranial Vertebral Arteries:  No hemodynamically significant stenosis or occlusion.   - Basilar Artery:  No hemodynamically significant stenosis or occlusion.   - Posterior Cerebral Arteries: There is a hypoplastic P1 segment of the left PCA with prominent left PCOM supplying the posterior circulation. No hemodynamically significant stenosis or occlusion.       1.  Atherosclerotic calcification of the carotid bulbs resulting in approximately 25% stenosis of the proximal right cervical ICA. The remainder of the right cervical ICA is patent. 2. Otherwise, there is no evidence of source vessel arterial occlusion, flow significant stenosis, dissection, or aneurysm within the head and neck. 3. Large right and moderate left pleural effusions. 4. Severe diffuse demineralization of the osseous structures noted with vertebral plana of T6 and severe compression deformity of T5 vertebral bodies. There is no retropulsion into the canal. 5.  Additional findings as above.     I personally reviewed the images/study and I agree with the findings as stated. This study was interpreted at [facility], [place], [place].   MACRO: None    Signed by: Walter Marsh 10/6/2023 9:59 AM Dictation workstation:   [id number]    CT brain attack head wo IV contrast    Result Date: 10/6/2023  Interpreted By:  Jcarlos Haynes, STUDY: CT BRAIN ATTACK HEAD WO IV CONTRAST;  10/6/2023 9:03 am   INDICATION: Signs/Symptoms:ams.   COMPARISON: None   ACCESSION NUMBER(S): [id number]   ORDERING CLINICIAN: MATI VELIZ   TECHNIQUE: CT of the brain from the skull vertex to the skull base, without intravenous contrast   FINDINGS: ACUTE INTRA-AXIAL HEMORRHAGE:  Negative   ACUTE EXTRA-AXIAL/SUBDURAL HEMORRHAGE:  Negative   ACUTE INTRACRANIAL MASS EFFECT:  Negative   CT EVIDENCE OF ACUTE / SUBACUTE TERRITORIAL ISCHEMIA:  Negative   VENTRICLES:  Diffusely prominent for size but in proportion to the degree of atrophy; no acute pathologic findings   OTHER BRAIN FINDINGS:  Moderate degree of deep white matter hypodensities that are nonspecific, but in this age group, most likely the sequela of chronic microvascular disease   INCLUDED PARANASAL SINUSES: All clear   INCLUDED MASTOID AIR CELLS: All clear   SKULL:  No lytic or blastic lesion   EXTRACRANIAL SOFT TISSUES:  Scalp and occular globes grossly normal by CT       NEGATIVE BRAIN ATTACK PROTOCOL CT BRAIN:   NO ACUTE INTRACRANIAL HEMORRHAGE   NO ACUTE INTRACRANIAL MASS EFFECT   NO CT EVIDENCE OF A LARGE ACUTE TERRITORIAL INFARCT   I WAS ABLE TO COMMUNICATE THESE FINDINGS BY TELEPHONE TO DR Veliz AT 0910 HOURS, SAME DAY, 6 October 2023   MACRO: Negative CT brain for brain attack. Jcarlos Haynes discussed the significance and urgency of this critical finding by telephone with MATI VELIZ on 10/6/2023 at 9:12 am.  (**-RCF-**) Findings: See findings.   Signed by: Jcarlos Haynes 10/6/2023 9:13 AM Dictation workstation:   [id number]      Medications  lactated Ringer's, 500 mL, intravenous, Once  [START ON 10/7/2023] lactulose, 20 g, orogastric tube, TID  piperacillin-tazobactam, 2.25 g, intravenous, q6h             Assessment/Plan   Principal Problem:    Hepatic encephalopathy (CMS/HCC)      Hepatic encephalopathy breathy and impending liver failure, with high ammonia level, already on lactulose, has an NG tube as she is lethargic, will give lactulose, also started enteral feeding,.  She is running a low blood pressure she was given IV fluids we will try to avoid the IV fluids as patient also has ascites and effusion,.  Ruling out sepsis, cultures were sent on Zosyn.  With high INR unable to give subcu heparin.  Patient will be transferred to [facility].           I spent 75 minutes in the professional and overall care of this patient.    Shiloh Nguyen MD

## 2023-10-11 LAB — BACTERIA BLD CULT: NORMAL
